# Patient Record
Sex: FEMALE | Race: WHITE | NOT HISPANIC OR LATINO | URBAN - METROPOLITAN AREA
[De-identification: names, ages, dates, MRNs, and addresses within clinical notes are randomized per-mention and may not be internally consistent; named-entity substitution may affect disease eponyms.]

---

## 2022-01-31 ENCOUNTER — APPOINTMENT (RX ONLY)
Dept: URBAN - METROPOLITAN AREA CLINIC 139 | Facility: CLINIC | Age: 67
Setting detail: DERMATOLOGY
End: 2022-01-31

## 2022-01-31 DIAGNOSIS — L82.0 INFLAMED SEBORRHEIC KERATOSIS: ICD-10-CM

## 2022-01-31 DIAGNOSIS — L72.0 EPIDERMAL CYST: ICD-10-CM

## 2022-01-31 DIAGNOSIS — Z12.83 ENCOUNTER FOR SCREENING FOR MALIGNANT NEOPLASM OF SKIN: ICD-10-CM

## 2022-01-31 PROBLEM — D48.5 NEOPLASM OF UNCERTAIN BEHAVIOR OF SKIN: Status: ACTIVE | Noted: 2022-01-31

## 2022-01-31 PROCEDURE — ? INCISION AND DRAINAGE WITH PATHOLOGY

## 2022-01-31 PROCEDURE — 17110 DESTRUCTION B9 LES UP TO 14: CPT

## 2022-01-31 PROCEDURE — ? LIQUID NITROGEN

## 2022-01-31 PROCEDURE — ? COUNSELING

## 2022-01-31 PROCEDURE — 10060 I&D ABSCESS SIMPLE/SINGLE: CPT

## 2022-01-31 PROCEDURE — ? PATIENT SPECIFIC COUNSELING

## 2022-01-31 PROCEDURE — 99202 OFFICE O/P NEW SF 15 MIN: CPT | Mod: 25

## 2022-01-31 ASSESSMENT — LOCATION SIMPLE DESCRIPTION DERM
LOCATION SIMPLE: LEFT LOWER BACK
LOCATION SIMPLE: RIGHT EYEBROW
LOCATION SIMPLE: LEFT UPPER BACK

## 2022-01-31 ASSESSMENT — LOCATION ZONE DERM
LOCATION ZONE: TRUNK
LOCATION ZONE: FACE

## 2022-01-31 ASSESSMENT — LOCATION DETAILED DESCRIPTION DERM
LOCATION DETAILED: RIGHT LATERAL EYEBROW
LOCATION DETAILED: LEFT MEDIAL UPPER BACK
LOCATION DETAILED: LEFT SUPERIOR MEDIAL MIDBACK

## 2022-01-31 NOTE — PROCEDURE: INCISION AND DRAINAGE WITH PATHOLOGY
Detail Level: Detailed
Lesion Type: Abscess
Method: 11 blade
Curette: No
Anesthesia Type: 2% lidocaine with epinephrine
Anesthesia Volume In Cc: 0.4
Size Of Lesion In Cm (Optional But May Be Required For Some Insurances): 0
Wound Care: Vaseline
Dressing: steri-strips
Lab: [id number]
Lab Facility: [id number]
Epidermal Sutures: 4-0 Ethilon
Epidermal Closure: simple interrupted
Suture Text: The incision was partially closed with
Preparation Text: The area was prepped in the usual clean fashion.
Curette Text (Optional): After the contents were expressed a curette was used to partially remove the cyst wall.
Histology Text: Following the procedure a portion of the material was sent for histologic evaluation.
Biopsy Type: H and E
Consent was obtained and risks were reviewed including but not limited to delayed wound healing, infection, need for multiple I and D's, and pain.
Post-Care Instructions: I reviewed with the patient in detail post-care instructions. Patient should keep wound covered and call the office should any redness, pain, swelling or worsening occur.
Billing Type: Third-Party Bill

## 2022-01-31 NOTE — PROCEDURE: LIQUID NITROGEN
Add 52 Modifier (Optional): no
Show Topical Anesthesia Variable?: Yes
Medical Necessity Information: It is in your best interest to select a reason for this procedure from the list below. All of these items fulfill various CMS LCD requirements except the new and changing color options.
Detail Level: Detailed
Spray Paint Text: The liquid nitrogen was applied to the skin utilizing a spray paint frosting technique.

## 2022-01-31 NOTE — PROCEDURE: PATIENT SPECIFIC COUNSELING
Discussed with patient we will send referral to Dr. Rodriguez (ophthalmology) for excision of lesion pending pathology results. Referral will be sent today, pathology results will be sent once we receive them. Patient verbalized understanding to plan.
Detail Level: Zone